# Patient Record
Sex: MALE | Race: WHITE | NOT HISPANIC OR LATINO | URBAN - METROPOLITAN AREA
[De-identification: names, ages, dates, MRNs, and addresses within clinical notes are randomized per-mention and may not be internally consistent; named-entity substitution may affect disease eponyms.]

---

## 2019-02-20 ENCOUNTER — OFFICE VISIT - RIVER FALLS (OUTPATIENT)
Dept: FAMILY MEDICINE | Facility: CLINIC | Age: 29
End: 2019-02-20

## 2019-02-20 ASSESSMENT — MIFFLIN-ST. JEOR: SCORE: 2059.76

## 2019-08-07 ENCOUNTER — OFFICE VISIT - RIVER FALLS (OUTPATIENT)
Dept: FAMILY MEDICINE | Facility: CLINIC | Age: 29
End: 2019-08-07

## 2019-08-07 ASSESSMENT — MIFFLIN-ST. JEOR: SCORE: 2088.79

## 2019-08-08 ENCOUNTER — COMMUNICATION - RIVER FALLS (OUTPATIENT)
Dept: FAMILY MEDICINE | Facility: CLINIC | Age: 29
End: 2019-08-08

## 2019-08-08 LAB
BUN SERPL-MCNC: 23 MG/DL (ref 7–25)
BUN/CREAT RATIO - HISTORICAL: NORMAL (ref 6–22)
CALCIUM SERPL-MCNC: 9.5 MG/DL (ref 8.6–10.3)
CHLORIDE BLD-SCNC: 106 MMOL/L (ref 98–110)
CHOLEST SERPL-MCNC: 215 MG/DL
CHOLEST/HDLC SERPL: 6.5 {RATIO}
CO2 SERPL-SCNC: 28 MMOL/L (ref 20–32)
CREAT SERPL-MCNC: 1.13 MG/DL (ref 0.6–1.35)
EGFRCR SERPLBLD CKD-EPI 2021: 89 ML/MIN/1.73M2
GLUCOSE BLD-MCNC: 84 MG/DL (ref 65–139)
HDLC SERPL-MCNC: 33 MG/DL
LDLC SERPL CALC-MCNC: 139 MG/DL
LDLC SERPL CALC-MCNC: ABNORMAL MG/DL
NONHDLC SERPL-MCNC: 182 MG/DL
POTASSIUM BLD-SCNC: 4.4 MMOL/L (ref 3.5–5.3)
SODIUM SERPL-SCNC: 140 MMOL/L (ref 135–146)
TRIGL SERPL-MCNC: 527 MG/DL

## 2022-02-11 VITALS
WEIGHT: 228.6 LBS | BODY MASS INDEX: 30.3 KG/M2 | SYSTOLIC BLOOD PRESSURE: 126 MMHG | OXYGEN SATURATION: 96 % | HEART RATE: 100 BPM | TEMPERATURE: 101.1 F | HEIGHT: 73 IN | DIASTOLIC BLOOD PRESSURE: 76 MMHG

## 2022-02-11 VITALS
WEIGHT: 235 LBS | HEIGHT: 73 IN | DIASTOLIC BLOOD PRESSURE: 78 MMHG | SYSTOLIC BLOOD PRESSURE: 138 MMHG | BODY MASS INDEX: 31.14 KG/M2 | RESPIRATION RATE: 16 BRPM | HEART RATE: 80 BPM | TEMPERATURE: 98.9 F

## 2022-02-16 NOTE — NURSING NOTE
Comprehensive Intake Entered On:  8/7/2019 3:57 PM CDT    Performed On:  8/7/2019 3:51 PM CDT by Haroldo Yip CMA               Summary   Systolic Blood Pressure :   138 mmHg (HI)    Diastolic Blood Pressure :   78 mmHg   Mean Arterial Pressure :   98 mmHg   BP Site :   Right arm   Maxx Yip CMAle - 8/7/2019 4:35 PM CDT   Chief Complaint :   Pt here for a Px and needs Biometric screening form filled out.  Pt is not fasting today   Weight Measured :   235 lb(Converted to: 235 lb 0 oz, 106.59 kg)    Height Measured :   73.25 in(Converted to: 6 ft 1 in, 186.05 cm)    Body Mass Index :   30.79 kg/m2 (HI)    Body Surface Area :   2.34 m2   Peripheral Pulse Rate :   80 bpm   Pulse Site :   Radial artery   Temperature Tympanic :   98.9 DegF(Converted to: 37.2 DegC)    Respiratory Rate :   16 br/min   Haroldo Yip CMA - 8/7/2019 3:51 PM CDT   Health Status   Allergies Verified? :   Yes   Medication History Verified? :   Yes   Medical History Verified? :   Yes   Pre-Visit Planning Status :   Completed   Tobacco Use? :   Never smoker   Haroldo Yip CMA - 8/7/2019 3:51 PM CDT   Meds / Allergies   (As Of: 8/7/2019 3:57:17 PM CDT)   Allergies (Active)   No Known Medication Allergies  Estimated Onset Date:   Unspecified ; Created By:   Johanna Correa; Reaction Status:   Active ; Category:   Drug ; Substance:   No Known Medication Allergies ; Type:   Allergy ; Updated By:   Johanna Correa; Reviewed Date:   8/7/2019 3:54 PM CDT        Medication List   (As Of: 8/7/2019 3:57:17 PM CDT)   Prescription/Discharge Order    albuterol  :   albuterol ; Status:   Processing ; Ordered As Mnemonic:   albuterol 90 mcg/inh inhalation aerosol ; Ordering Provider:   Jordan Tse MD; Action Display:   Complete ; Catalog Code:   albuterol ; Order Dt/Tm:   8/7/2019 3:53:52 PM          azithromycin  :   azithromycin ; Status:   Processing ; Ordered As Mnemonic:   Zithromax Z-Gulshan 250 mg oral tablet ; Ordering Provider:   Dedrick  Jordan BRANDON; Action Display:   Complete ; Catalog Code:   azithromycin ; Order Dt/Tm:   8/7/2019 3:51:37 PM          oseltamivir  :   oseltamivir ; Status:   Processing ; Ordered As Mnemonic:   Tamiflu 75 mg oral capsule ; Ordering Provider:   Jordan Tse MD; Action Display:   Complete ; Catalog Code:   oseltamivir ; Order Dt/Tm:   8/7/2019 3:51:39 PM            Home Meds    amoxicillin  :   amoxicillin ; Status:   Processing ; Ordered As Mnemonic:   amoxicillin 875 mg oral tablet ; Action Display:   Complete ; Catalog Code:   amoxicillin ; Order Dt/Tm:   8/7/2019 3:51:41 PM            Procedures / Surgeries        -    Procedure History   (As Of: 8/7/2019 3:57:17 PM CDT)     Procedure Dt/Tm:   12/16/1993 ; Anesthesia Minutes:   0 ; Procedure Name:   Inguinal herniorrhaphy ; Procedure Minutes:   0 ; Comments:     3/28/2014 1:19 PM CDT - Johanna Correa  Bilateral. Excision of a hypertrophied right inguinal lymph node.            Anesthesia Minutes:   0 ; Procedure Name:   Extraction of wisdom tooth ; Procedure Minutes:   0 ; Last Reviewed Dt/Tm:   8/7/2019 3:54:15 PM CDT            Family History   Family History   (As Of: 8/7/2019 3:57:17 PM CDT)   Mother: Lis  Full Name:   Lis ; Relation:   Mother ; Gender:   Female ;  YOB: 1969 12:00:00 AM CST ; Negative History          Father: Cole  Full Name:   Cole ; Relation:   Father ; Gender:   Male ;  YOB: 1968 12:00:00 AM CST ; Negative History          Brother: César  Full Name:   César ; Relation:   Brother ; Gender:   Male ;  Negative History            Social History   Social History   (As Of: 8/7/2019 3:57:17 PM CDT)   Alcohol:        Past   (Last Updated: 9/18/2015 2:59:27 PM CDT by Cassandra Acevedo CMA)          Tobacco:        Current, Oral, Snuff, 5 year(s).   (Last Updated: 9/18/2015 2:59:42 PM CDT by Cassandra Acevedo CMA)          Substance Abuse:        Never   (Last Updated: 9/18/2015 2:59:19 PM CDT by Kristina MCCLAIN  Cassandra)          Employment/School:        Employed, Work/School description: .   (Last Updated: 9/18/2015 2:59:03 PM CDT by Cassandra Acevedo CMA)          Home/Environment:        Marital status: Single.   (Last Updated: 9/18/2015 3:00:07 PM CDT by Cassandra Acevedo CMA)          Nutrition/Health:        Type of diet: Regular.   (Last Updated: 9/18/2015 3:00:03 PM CDT by Cassandra Acevedo CMA)          Sexual:        Sexually active: No.  Sexual orientation: Heterosexual.  Uses condoms: Yes.   (Last Updated: 9/18/2015 2:59:56 PM CDT by Cassandra Acevedo CMA)

## 2022-02-16 NOTE — LETTER
(Inserted Image. Unable to display)     November 11, 2019      TONY CARNEY   [place], [place] [id number]          Dear TONY,      Thank you for selecting [facility] (previously [employer]) for your healthcare needs.      Our records indicate you are due for the following services:     Fasting Lab Tests ~ Please do not eat or drink anything 10 hours prior to your scheduled appointment time.  (Water and any medications that you may need are allowed unless directed otherwise.)    If you had your labs done at another facility or with Direct Access Lab Testing at [facility], please bring in a copy of the results to your next visit, mail a copy, or drop off a copy of your results to your Healthcare Provider.      To schedule an appointment or if you have further questions, please contact your primary clinic:   [facility]       [phone]   [facility]       [phone]              [facility]     [phone]      Powered by [employer]    Sincerely,    Williams Reynolds PA-C

## 2022-02-16 NOTE — NURSING NOTE
Comprehensive Intake Entered On:  2/20/2019 9:27 AM CST    Performed On:  2/20/2019 9:24 AM CST by Cassandra Khalil MA               Summary   Chief Complaint :   Here for productive cough, sore throat, chest congestion, chills, and sweats    Weight Measured :   228.6 lb(Converted to: 228 lb 10 oz, 103.69 kg)    Height Measured :   73.25 in(Converted to: 6 ft 1 in, 186.05 cm)    Body Mass Index :   29.95 kg/m2 (HI)    Body Surface Area :   2.31 m2   Systolic Blood Pressure :   126 mmHg   Diastolic Blood Pressure :   76 mmHg   Mean Arterial Pressure :   93 mmHg   Peripheral Pulse Rate :   100 bpm   BP Site :   Right arm   Pulse Site :   Radial artery   Temperature Tympanic :   101.1 DegF(Converted to: 38.4 DegC)  (HI)    Oxygen Saturation :   96 %   Cassandra Khalil MA - 2/20/2019 9:24 AM CST   Health Status   Allergies Verified? :   Yes   Medication History Verified? :   Yes   Medical History Verified? :   No   Pre-Visit Planning Status :   N/A   Tobacco Use? :   Current every day smoker   Cassandra Khalil MA - 2/20/2019 9:24 AM CST   Consents   Consent for Immunization Exchange :   Consent Granted   Consent for Immunizations to Providers :   Consent Granted   Cassandra Khalil MA - 2/20/2019 9:24 AM CST   Meds / Allergies   (As Of: 2/20/2019 9:27:06 AM CST)   Allergies (Active)   No Known Medication Allergies  Estimated Onset Date:   Unspecified ; Created By:   Johanna Correa; Reaction Status:   Active ; Category:   Drug ; Substance:   No Known Medication Allergies ; Type:   Allergy ; Updated By:   Johanna Correa; Reviewed Date:   11/18/2016 10:42 PM CST        Medication List   (As Of: 2/20/2019 9:27:06 AM CST)   Prescription/Discharge Order    FLUoxetine  :   FLUoxetine ; Status:   Completed ; Ordered As Mnemonic:   FLUoxetine 20 mg oral tablet ; Simple Display Line:   40 mg, 2 tab(s), PO, Daily, 30 tab(s), 0 Refill(s) ; Ordering Provider:   Sharath Pavon MD; Catalog Code:   FLUoxetine ;  Order Dt/Tm:   11/18/2016 3:32:29 PM            Home Meds    amoxicillin  :   amoxicillin ; Status:   Documented ; Ordered As Mnemonic:   amoxicillin 875 mg oral tablet ; Simple Display Line:   875 mg, 1 tab(s), Oral, q12 hrs, 0 Refill(s) ; Catalog Code:   amoxicillin ; Order Dt/Tm:   2/20/2019 9:25:29 AM

## 2022-02-16 NOTE — NURSING NOTE
Depression Screening Entered On:  8/7/2019 6:22 PM CDT    Performed On:  8/7/2019 6:22 PM CDT by Haroldo Yip CMA               Depression Screening   Little Interest - Pleasure in Activities :   Not at all   Feeling Down, Depressed, Hopeless :   Not at all   Initial Depression Screen Score :   0    Trouble Falling or Staying Asleep :   Not at all   Feeling Tired or Little Energy :   Several days   Poor Appetite or Overeating :   Not at all   Feeling Bad About Yourself :   Not at all   Trouble Concentrating :   Not at all   Moving or Speaking Slowly :   Not at all   Thoughts Better Off Dead or Hurting Self :   Not at all   Detailed Depression Screen Score :   1    Total Depression Screen Score :   1    JALEESA Difficulty with Work, Home, Others :   Not difficult at all   Haroldo Yip CMA - 8/7/2019 6:22 PM CDT

## 2022-02-16 NOTE — PROGRESS NOTES
Patient:   TONY CARNEY            MRN: [id number]            FIN: [id number]               Age:   29 years     Sex:  Male     :  1990   Associated Diagnoses:   Annual physical exam; Screening for diabetes mellitus; Screening for lipid disorders; Tobacco user   Author:   Williams Reynolds PA-C      Visit Information   Visit type:  Annual exam.       Chief Complaint   2019 3:51 PM CDT     Pt here for a Px and needs Biometric screening form filled out.  Pt is not fasting today      Well Adult History   Well Adult History             The patient presents for well adult exam, he just took a job as a  with [employer] and is here for a biometric screen  he has no concerns today.  The patient's general health status is described as good.  Preventive services:  Last lipid panel: will check today  BP:  .  Additional pertinent history: no caffeine use, tobacco use chews tobacco and weekly alcohol use.  Patient's PHQ9 CAGE questionnaire reviewed and discussed with patient.   .        Review of Systems   Constitutional:  Negative.    Eye:  Negative.    Ear/Nose/Mouth/Throat:  Negative.    Respiratory:  Negative.    Cardiovascular:  Negative.    Gastrointestinal:  Negative.    Genitourinary:  Negative.    Hematology/Lymphatics:  Negative.    Endocrine:  Negative.    Immunologic:  Negative.    Musculoskeletal:  Negative.    Psychiatric:  PHQ-9=  1.       Health Status   Allergies:    Allergic Reactions (Selected)  No Known Medication Allergies   Medications:  (Selected)   , not on any regular medications   Problem list:    All Problems  Tobacco user / SNOMED CT 387634630 / Probable  Obesity / SNOMED CT 1511982573 / Probable  Major depressive disorder, recurrent, moderate / SNOMED CT 94553861 / Confirmed  Resolved: H/O: chickenpox / SNOMED CT 658434176      Histories   Past Medical History:    Resolved  H/O: chickenpox ([id number]):  Resolved.   Family History:    Mother: Lis      History is negative.  Father: Cole       History is negative.  Brother: César      History is negative.     Procedure history:    Inguinal herniorrhaphy ([id number]) on 12/16/1993 at 20 Months.  Comments:  3/28/2014 1:19 PM CDT - Johanna Correa  Bilateral. Excision of a hypertrophied right inguinal lymph node.  Extraction of wisdom tooth ([id number]).   Social History:        Alcohol Assessment            Past      Tobacco Assessment            Current, Oral, Snuff, 5 year(s).      Substance Abuse Assessment            Never      Employment and Education Assessment            Employed, Work/School description: .      Home and Environment Assessment            Marital status: Single.      Nutrition and Health Assessment            Type of diet: Regular.      Sexual Assessment            Sexually active: No.  Sexual orientation: Heterosexual.  Uses condoms: Yes.        Physical Examination   Vital Signs   8/7/2019 3:51 PM CDT Temperature Tympanic 98.9 DegF    Peripheral Pulse Rate 80 bpm    Pulse Site Radial artery    Respiratory Rate 16 br/min    Systolic Blood Pressure 138 mmHg  HI    Diastolic Blood Pressure 78 mmHg    Mean Arterial Pressure 98 mmHg    BP Site Right arm      Measurements from flowsheet : Measurements   8/7/2019 3:51 PM CDT Height Measured - Standard 73.25 in    Weight Measured - Standard 235 lb    BSA 2.34 m2    Body Mass Index 30.79 kg/m2  HI      General:  No acute distress.    Eye:  Pupils are equal, round and reactive to light, Extraocular movements are intact.    HENT:  Tympanic membranes are clear, No pharyngeal erythema, No sinus tenderness.    Neck:  Supple, Non-tender, No lymphadenopathy.    Respiratory:  Lungs are clear to auscultation.    Cardiovascular:  Normal rate, Regular rhythm, No murmur.    Gastrointestinal:  Soft, Non-tender, Non-distended, Normal bowel sounds, No organomegaly.    Genitourinary:  testicles smooth symmetrical, without nodules, small hydrocele on left side (stable), no rashes or  lesions, no hernia present.    Musculoskeletal:  Normal range of motion.    Neurologic:  Cranial Nerves II-XII are grossly intact, Normal deep tendon reflexes.    Psychiatric:  Appropriate mood & affect.       Health Maintenance   Immunization schedule      Impression and Plan   Diagnosis     Annual physical exam (QHR67-EZ Z00.00).     Screening for diabetes mellitus (UJD92-PK Z13.1).     Screening for lipid disorders (LGX73-YZ Z13.220).     Tobacco user (WRK07-YH Z72.0).     Course:  Well controlled.    Patient Instructions:       Counseled: tobacco cessation, discussed moderate alcohol use, encouraged to avoid binge drinking on weekends.    Orders     Orders   Lab (Gen Lab  Reference Lab):  Lipid panel with reflex to direct ldl* ([facility]) (Order): Specimen Type: Serum, Collection Date: 8/7/2019 4:20 PM CDT  Basic Metabolic Panel* ([facility]) (Order): Specimen Type: Serum, Collection Date: 8/7/2019 4:20 PM CDT.     Orders   Charges (Evaluation and Management):  96305 periodic preventive med est patient 18-39 yrs (Charge) (Order): Quantity: 1, Annual physical exam  Screening for lipid disorders  Tobacco user  Screening for diabetes mellitus.     Summary:  given Adacel booster, will check labs today.

## 2022-02-16 NOTE — NURSING NOTE
CAGE Assessment Entered On:  8/7/2019 6:22 PM CDT    Performed On:  8/7/2019 6:22 PM CDT by Haroldo Yip CMA               Assessment   Have you ever felt you should cut down on your drinking :   No   Have people annoyed you by criticizing your drinking :   No   Have you ever felt bad or guilty about your drinking :   No   Have you ever taken a drink first thing in the morning to steady your nerves or get rid of a hangover (Eye-opener) :   Yes   CAGE Score :   1    Haroldo Yip CMA - 8/7/2019 6:22 PM CDT

## 2022-02-16 NOTE — LETTER
(Inserted Image. Unable to display)   August 08, 2019      TONY ROMMEL       [place], [place] [id number]        Dear TONY,    Thank you for selecting [facility] for your healthcare needs.  Below you will find the results of you recent tests done at our clinic.     Chemistry labs are acceptable.  However, cholesterol and lipids are elevated, especially your triglycerides.  Please recheck these labs in 3 months (make sure it is a fasting test).      Result Name Current Result Reference Range   Sodium Level (mmol/L)  140 8/7/2019 135 - 146   Potassium Level (mmol/L)  4.4 8/7/2019 3.5 - 5.3   Chloride Level (mmol/L)  106 8/7/2019 98 - 110   CO2 Level (mmol/L)  28 8/7/2019 20 - 32   Glucose Level (mg/dL)  84 8/7/2019 65 - 139   BUN (mg/dL)  23 8/7/2019 7 - 25   Creatinine Level (mg/dL)  1.13 8/7/2019 0.60 - 1.35   eGFR (mL/min/1.73m2)  89 8/7/2019 > OR = 60 -    Calcium Level (mg/dL)  9.5 8/7/2019 8.6 - 10.3   Cholesterol (mg/dL) ((H)) 215 8/7/2019  - <200   Non-HDL Cholesterol ((H)) 182 8/7/2019  - <130   HDL (mg/dL) ((L)) 33 8/7/2019 >40 -    Cholesterol/HDL Ratio ((H)) 6.5 8/7/2019  - <5.0   LDL Direct (mg/dL) ((H)) 139 8/7/2019  - <100   Triglyceride (mg/dL) ((H)) 527 8/7/2019  - <150       Please contact me or my assistant at [phone] if you have any questions.     Sincerely,        Williams Reynolds PA-C    What do your labs mean?  Below is a glossary of commonly ordered labs:  LDL   Bad Cholesterol   HDL   Good Cholesterol  AST/ALT   Liver Function   Cr/Creatinine   Kidney Function  Microalbumin   Kidney Function  BUN   Kidney Function  PSA   Prostate    TSH   Thyroid Hormone  HgbA1c   Diabetes Test   Hgb (Hemoglobin)   Red Blood Cells

## 2022-02-16 NOTE — PROGRESS NOTES
Patient:   TONY CARNEY            MRN: [id number]            FIN: [id number]               Age:   28 years     Sex:  Male     :  1990   Associated Diagnoses:   Influenza   Author:   Jordan Tse MD      Chief Complaint   2019 9:24 AM CST    Here for productive cough, sore throat, chest congestion, chills, and sweats      History of Present Illness   see chief complaint as noted above and confirmed with the patient     28 year old male here today with complaint of not feeling well. Around  he developed a productive cough, chest congestion, a sore throat, chills, and sweats. He had some Amoxicillian so he took one tablet BID for about seven days. His symptoms then improved some and he thought he was getting better. This past weekend he had a party at his house outside and he felt the cold air made his symptoms return. He developed Fevers yesterday, chills, sweats, a productive cough with sputum production, he says he was coughing so hard last night he had to vomit to get the cough to stop. He has been feeling tired and has body aches. He has been struggling at work due to not feeling well.       Review of Systems   Constitutional:  Fever, Chills, Sweats, Fatigue, Decreased activity.    Ear/Nose/Mouth/Throat:  Nasal congestion, Sore throat, No ear pain.    Respiratory:  Cough, Sputum production, No shortness of breath, No wheezing.    Cardiovascular:  No chest pain, No palpitations.    Gastrointestinal:  No nausea, No vomiting, No diarrhea, No abdominal pain.    Musculoskeletal:  Muscle pain.    Integumentary:  No rash.    Neurologic:  Alert and oriented X4, Headache.       Health Status   Allergies:    Allergic Reactions (Selected)  No Known Medication Allergies   Medications:  (Selected)   Documented Medications  Documented  amoxicillin 875 mg oral tablet: = 1 tab(s) ( 875 mg ), Oral, q12 hrs, 0 Refill(s), Type: Maintenance   Problem list:    All Problems  Tobacco user / SNOMED CT  506642505 / Probable  Major depressive disorder, recurrent, moderate / SNOMED CT 05786516 / Confirmed      Histories   Past Medical History:    No active or resolved past medical history items have been selected or recorded.   Family History:       Procedure history:    Inguinal herniorrhaphy ([id number]) on 12/16/1993 at 20 Months.  Comments:  3/28/2014 1:19 PM CDT - Johanna Correa  Bilateral. Excision of a hypertrophied right inguinal lymph node.   Social History:        Alcohol Assessment            Past      Tobacco Assessment            Current, Oral, Snuff, 5 year(s).      Substance Abuse Assessment            Never      Employment and Education Assessment            Employed, Work/School description: .      Home and Environment Assessment            Marital status: Single.      Nutrition and Health Assessment            Type of diet: Regular.      Sexual Assessment            Sexually active: No.  Sexual orientation: Heterosexual.  Uses condoms: Yes.      Physical Examination   Vital Signs   2/20/2019 9:24 AM CST Temperature Tympanic 101.1 DegF  HI    Peripheral Pulse Rate 100 bpm    Pulse Site Radial artery    Systolic Blood Pressure 126 mmHg    Diastolic Blood Pressure 76 mmHg    Mean Arterial Pressure 93 mmHg    BP Site Right arm    Oxygen Saturation 96 %      Measurements from flowsheet : Measurements   2/20/2019 9:24 AM CST Height Measured - Standard 73.25 in    Weight Measured - Standard 228.6 lb    BSA 2.31 m2    Body Mass Index 29.95 kg/m2  HI      General:  Alert and oriented, No acute distress.    Eye:  Pupils are equal, round and reactive to light, Normal conjunctiva.    HENT:  Oral mucosa is moist.    Neck:  Supple.    Respiratory:  Lungs are clear to auscultation, Respirations are non-labored.    Cardiovascular:  Normal rate, Regular rhythm, No edema.    Gastrointestinal:  Non-distended.    Musculoskeletal:  Normal gait.    Integumentary:  Warm, No rash.    Psychiatric:   Cooperative, Appropriate mood & affect, Normal judgment.       Review / Management   Results review      Impression and Plan       Diagnosis     Influenza (EJO07-UK J11.1).     Plan:  Discussed symptoms    Will treat with Tamiflu for 5 days     Albuterol inhaler as needed for cough    a Z-yohan for infection.     Advised he rest, take Tylenol or Ibuprofen, and drink plenty of fluids.     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 .    Summary:  Reviewed expected cause with patient    What to watch for     and when to return  .    Cassandra GHOSH Medical Assistant acted solely as a scribe for, and in presence of Dr. Jordan Tse who performed the services.